# Patient Record
Sex: FEMALE | Race: WHITE | NOT HISPANIC OR LATINO | URBAN - METROPOLITAN AREA
[De-identification: names, ages, dates, MRNs, and addresses within clinical notes are randomized per-mention and may not be internally consistent; named-entity substitution may affect disease eponyms.]

---

## 2017-01-12 ENCOUNTER — OUTPATIENT (OUTPATIENT)
Dept: OUTPATIENT SERVICES | Facility: HOSPITAL | Age: 77
LOS: 1 days | End: 2017-01-12
Payer: MEDICARE

## 2017-01-12 ENCOUNTER — APPOINTMENT (OUTPATIENT)
Dept: CARDIOLOGY | Facility: CLINIC | Age: 77
End: 2017-01-12

## 2017-01-12 VITALS
SYSTOLIC BLOOD PRESSURE: 159 MMHG | BODY MASS INDEX: 27.59 KG/M2 | OXYGEN SATURATION: 96 % | TEMPERATURE: 98.4 F | DIASTOLIC BLOOD PRESSURE: 90 MMHG | WEIGHT: 146 LBS | RESPIRATION RATE: 18 BRPM | HEART RATE: 77 BPM

## 2017-01-12 DIAGNOSIS — Z98.89 OTHER SPECIFIED POSTPROCEDURAL STATES: Chronic | ICD-10-CM

## 2017-01-12 DIAGNOSIS — E07.89 OTHER SPECIFIED DISORDERS OF THYROID: Chronic | ICD-10-CM

## 2017-01-12 DIAGNOSIS — Z00.8 ENCOUNTER FOR OTHER GENERAL EXAMINATION: ICD-10-CM

## 2017-01-12 DIAGNOSIS — N28.89 OTHER SPECIFIED DISORDERS OF KIDNEY AND URETER: Chronic | ICD-10-CM

## 2017-01-12 DIAGNOSIS — Z86.39 PERSONAL HISTORY OF OTHER ENDOCRINE, NUTRITIONAL AND METABOLIC DISEASE: ICD-10-CM

## 2017-01-12 DIAGNOSIS — N83.20 UNSPECIFIED OVARIAN CYSTS: Chronic | ICD-10-CM

## 2017-01-12 PROCEDURE — 93010 ELECTROCARDIOGRAM REPORT: CPT

## 2017-01-12 PROCEDURE — 93005 ELECTROCARDIOGRAM TRACING: CPT

## 2017-01-12 PROCEDURE — G0463: CPT | Mod: 25

## 2017-02-06 ENCOUNTER — RX RENEWAL (OUTPATIENT)
Age: 77
End: 2017-02-06

## 2017-06-06 ENCOUNTER — APPOINTMENT (OUTPATIENT)
Dept: NEUROLOGY | Facility: CLINIC | Age: 77
End: 2017-06-06

## 2017-06-13 ENCOUNTER — APPOINTMENT (OUTPATIENT)
Dept: NEUROLOGY | Facility: CLINIC | Age: 77
End: 2017-06-13

## 2017-06-13 VITALS
DIASTOLIC BLOOD PRESSURE: 87 MMHG | HEIGHT: 61 IN | HEART RATE: 79 BPM | BODY MASS INDEX: 27 KG/M2 | SYSTOLIC BLOOD PRESSURE: 154 MMHG | WEIGHT: 143 LBS

## 2017-06-13 DIAGNOSIS — D32.9 BENIGN NEOPLASM OF MENINGES, UNSPECIFIED: ICD-10-CM

## 2017-06-26 ENCOUNTER — MEDICATION RENEWAL (OUTPATIENT)
Age: 77
End: 2017-06-26

## 2017-06-26 ENCOUNTER — RX RENEWAL (OUTPATIENT)
Age: 77
End: 2017-06-26

## 2017-08-10 ENCOUNTER — APPOINTMENT (OUTPATIENT)
Dept: CARDIOLOGY | Facility: CLINIC | Age: 77
End: 2017-08-10

## 2017-09-09 ENCOUNTER — APPOINTMENT (OUTPATIENT)
Dept: CARDIOLOGY | Facility: CLINIC | Age: 77
End: 2017-09-09

## 2017-10-15 ENCOUNTER — FORM ENCOUNTER (OUTPATIENT)
Age: 77
End: 2017-10-15

## 2017-10-16 ENCOUNTER — APPOINTMENT (OUTPATIENT)
Dept: MRI IMAGING | Facility: IMAGING CENTER | Age: 77
End: 2017-10-16
Payer: MEDICARE

## 2017-10-16 ENCOUNTER — OUTPATIENT (OUTPATIENT)
Dept: OUTPATIENT SERVICES | Facility: HOSPITAL | Age: 77
LOS: 1 days | End: 2017-10-16
Payer: MEDICARE

## 2017-10-16 DIAGNOSIS — N83.20 UNSPECIFIED OVARIAN CYSTS: Chronic | ICD-10-CM

## 2017-10-16 DIAGNOSIS — N28.89 OTHER SPECIFIED DISORDERS OF KIDNEY AND URETER: Chronic | ICD-10-CM

## 2017-10-16 DIAGNOSIS — E07.89 OTHER SPECIFIED DISORDERS OF THYROID: Chronic | ICD-10-CM

## 2017-10-16 DIAGNOSIS — Z98.89 OTHER SPECIFIED POSTPROCEDURAL STATES: Chronic | ICD-10-CM

## 2017-10-16 DIAGNOSIS — Z00.8 ENCOUNTER FOR OTHER GENERAL EXAMINATION: ICD-10-CM

## 2017-10-16 PROCEDURE — 70553 MRI BRAIN STEM W/O & W/DYE: CPT | Mod: 26

## 2017-10-16 PROCEDURE — 70553 MRI BRAIN STEM W/O & W/DYE: CPT

## 2017-10-16 PROCEDURE — 82565 ASSAY OF CREATININE: CPT

## 2017-10-17 ENCOUNTER — OTHER (OUTPATIENT)
Age: 77
End: 2017-10-17

## 2017-10-19 ENCOUNTER — FORM ENCOUNTER (OUTPATIENT)
Age: 77
End: 2017-10-19

## 2017-10-20 ENCOUNTER — APPOINTMENT (OUTPATIENT)
Dept: NEUROLOGY | Facility: CLINIC | Age: 77
End: 2017-10-20
Payer: MEDICARE

## 2017-10-20 ENCOUNTER — APPOINTMENT (OUTPATIENT)
Dept: CT IMAGING | Facility: CLINIC | Age: 77
End: 2017-10-20
Payer: MEDICARE

## 2017-10-20 ENCOUNTER — OUTPATIENT (OUTPATIENT)
Dept: OUTPATIENT SERVICES | Facility: HOSPITAL | Age: 77
LOS: 1 days | End: 2017-10-20
Payer: MEDICARE

## 2017-10-20 VITALS
BODY MASS INDEX: 27 KG/M2 | DIASTOLIC BLOOD PRESSURE: 89 MMHG | WEIGHT: 143 LBS | HEIGHT: 61 IN | SYSTOLIC BLOOD PRESSURE: 165 MMHG | HEART RATE: 82 BPM

## 2017-10-20 DIAGNOSIS — E07.89 OTHER SPECIFIED DISORDERS OF THYROID: Chronic | ICD-10-CM

## 2017-10-20 DIAGNOSIS — Z98.89 OTHER SPECIFIED POSTPROCEDURAL STATES: Chronic | ICD-10-CM

## 2017-10-20 DIAGNOSIS — Z00.8 ENCOUNTER FOR OTHER GENERAL EXAMINATION: ICD-10-CM

## 2017-10-20 DIAGNOSIS — N83.20 UNSPECIFIED OVARIAN CYSTS: Chronic | ICD-10-CM

## 2017-10-20 DIAGNOSIS — N28.89 OTHER SPECIFIED DISORDERS OF KIDNEY AND URETER: Chronic | ICD-10-CM

## 2017-10-20 PROCEDURE — 71260 CT THORAX DX C+: CPT | Mod: 26

## 2017-10-20 PROCEDURE — 82565 ASSAY OF CREATININE: CPT

## 2017-10-20 PROCEDURE — 74177 CT ABD & PELVIS W/CONTRAST: CPT

## 2017-10-20 PROCEDURE — 71260 CT THORAX DX C+: CPT

## 2017-10-20 PROCEDURE — 99215 OFFICE O/P EST HI 40 MIN: CPT

## 2017-10-20 PROCEDURE — 74177 CT ABD & PELVIS W/CONTRAST: CPT | Mod: 26

## 2017-11-02 ENCOUNTER — APPOINTMENT (OUTPATIENT)
Dept: NEUROLOGY | Facility: CLINIC | Age: 77
End: 2017-11-02
Payer: MEDICARE

## 2017-11-02 VITALS
HEIGHT: 63 IN | OXYGEN SATURATION: 97 % | HEART RATE: 86 BPM | WEIGHT: 144 LBS | DIASTOLIC BLOOD PRESSURE: 80 MMHG | BODY MASS INDEX: 25.52 KG/M2 | SYSTOLIC BLOOD PRESSURE: 140 MMHG

## 2017-11-02 DIAGNOSIS — R90.89 OTHER ABNORMAL FINDINGS ON DIAGNOSTIC IMAGING OF CENTRAL NERVOUS SYSTEM: ICD-10-CM

## 2017-11-02 PROCEDURE — 99214 OFFICE O/P EST MOD 30 MIN: CPT

## 2017-11-13 ENCOUNTER — APPOINTMENT (OUTPATIENT)
Dept: MRI IMAGING | Facility: CLINIC | Age: 77
End: 2017-11-13
Payer: MEDICARE

## 2017-11-13 ENCOUNTER — OUTPATIENT (OUTPATIENT)
Dept: OUTPATIENT SERVICES | Facility: HOSPITAL | Age: 77
LOS: 1 days | End: 2017-11-13
Payer: MEDICARE

## 2017-11-13 DIAGNOSIS — Z98.89 OTHER SPECIFIED POSTPROCEDURAL STATES: Chronic | ICD-10-CM

## 2017-11-13 DIAGNOSIS — Z00.8 ENCOUNTER FOR OTHER GENERAL EXAMINATION: ICD-10-CM

## 2017-11-13 DIAGNOSIS — N28.89 OTHER SPECIFIED DISORDERS OF KIDNEY AND URETER: Chronic | ICD-10-CM

## 2017-11-13 DIAGNOSIS — E07.89 OTHER SPECIFIED DISORDERS OF THYROID: Chronic | ICD-10-CM

## 2017-11-13 DIAGNOSIS — N83.20 UNSPECIFIED OVARIAN CYSTS: Chronic | ICD-10-CM

## 2017-11-13 PROCEDURE — 82565 ASSAY OF CREATININE: CPT

## 2017-11-13 PROCEDURE — 70553 MRI BRAIN STEM W/O & W/DYE: CPT | Mod: 26

## 2017-11-13 PROCEDURE — A9585: CPT

## 2017-11-13 PROCEDURE — 70553 MRI BRAIN STEM W/O & W/DYE: CPT

## 2017-11-14 ENCOUNTER — APPOINTMENT (OUTPATIENT)
Dept: NEUROLOGY | Facility: CLINIC | Age: 77
End: 2017-11-14
Payer: MEDICARE

## 2017-11-14 VITALS
OXYGEN SATURATION: 96 % | HEIGHT: 63 IN | WEIGHT: 145 LBS | DIASTOLIC BLOOD PRESSURE: 70 MMHG | TEMPERATURE: 98.1 F | SYSTOLIC BLOOD PRESSURE: 130 MMHG | HEART RATE: 72 BPM | BODY MASS INDEX: 25.69 KG/M2

## 2017-11-14 PROCEDURE — 99213 OFFICE O/P EST LOW 20 MIN: CPT

## 2017-11-16 ENCOUNTER — APPOINTMENT (OUTPATIENT)
Dept: UROLOGY | Facility: CLINIC | Age: 77
End: 2017-11-16

## 2017-12-04 ENCOUNTER — APPOINTMENT (OUTPATIENT)
Dept: NEUROLOGY | Facility: CLINIC | Age: 77
End: 2017-12-04
Payer: MEDICARE

## 2017-12-04 VITALS
SYSTOLIC BLOOD PRESSURE: 165 MMHG | BODY MASS INDEX: 25.69 KG/M2 | DIASTOLIC BLOOD PRESSURE: 86 MMHG | HEIGHT: 63 IN | HEART RATE: 76 BPM | WEIGHT: 145 LBS

## 2017-12-04 PROCEDURE — 99215 OFFICE O/P EST HI 40 MIN: CPT

## 2017-12-07 ENCOUNTER — MEDICATION RENEWAL (OUTPATIENT)
Age: 77
End: 2017-12-07

## 2017-12-07 ENCOUNTER — TRANSCRIPTION ENCOUNTER (OUTPATIENT)
Age: 77
End: 2017-12-07

## 2017-12-07 ENCOUNTER — RX RENEWAL (OUTPATIENT)
Age: 77
End: 2017-12-07

## 2017-12-11 ENCOUNTER — TRANSCRIPTION ENCOUNTER (OUTPATIENT)
Age: 77
End: 2017-12-11

## 2017-12-11 ENCOUNTER — RX RENEWAL (OUTPATIENT)
Age: 77
End: 2017-12-11

## 2017-12-13 RX ORDER — ATORVASTATIN CALCIUM 10 MG/1
10 TABLET, FILM COATED ORAL
Qty: 90 | Refills: 0 | Status: ACTIVE | COMMUNITY
Start: 2017-12-11 | End: 1900-01-01

## 2017-12-26 ENCOUNTER — RX RENEWAL (OUTPATIENT)
Age: 77
End: 2017-12-26

## 2018-01-24 ENCOUNTER — APPOINTMENT (OUTPATIENT)
Dept: UROLOGY | Facility: CLINIC | Age: 78
End: 2018-01-24
Payer: MEDICARE

## 2018-01-24 DIAGNOSIS — Z00.00 ENCOUNTER FOR GENERAL ADULT MEDICAL EXAMINATION W/OUT ABNORMAL FINDINGS: ICD-10-CM

## 2018-01-24 DIAGNOSIS — R31.29 OTHER MICROSCOPIC HEMATURIA: ICD-10-CM

## 2018-01-24 PROCEDURE — 99214 OFFICE O/P EST MOD 30 MIN: CPT

## 2018-01-25 LAB
APPEARANCE: CLEAR
BACTERIA: NEGATIVE
BILIRUBIN URINE: NEGATIVE
BLOOD URINE: NEGATIVE
COLOR: YELLOW
GLUCOSE QUALITATIVE U: NEGATIVE MG/DL
HYALINE CASTS: 0 /LPF
KETONES URINE: NEGATIVE
LEUKOCYTE ESTERASE URINE: ABNORMAL
MICROSCOPIC-UA: NORMAL
NITRITE URINE: NEGATIVE
PH URINE: 7
PROTEIN URINE: NEGATIVE MG/DL
RED BLOOD CELLS URINE: 1 /HPF
SPECIFIC GRAVITY URINE: 1.01
SQUAMOUS EPITHELIAL CELLS: 2 /HPF
UROBILINOGEN URINE: NEGATIVE MG/DL
WHITE BLOOD CELLS URINE: 4 /HPF

## 2018-01-26 LAB — CORE LAB FLUID CYTOLOGY: NORMAL

## 2018-02-15 ENCOUNTER — OUTPATIENT (OUTPATIENT)
Dept: OUTPATIENT SERVICES | Facility: HOSPITAL | Age: 78
LOS: 1 days | End: 2018-02-15
Payer: MEDICARE

## 2018-02-15 ENCOUNTER — APPOINTMENT (OUTPATIENT)
Dept: MRI IMAGING | Facility: IMAGING CENTER | Age: 78
End: 2018-02-15
Payer: MEDICARE

## 2018-02-15 ENCOUNTER — APPOINTMENT (OUTPATIENT)
Dept: NEUROLOGY | Facility: CLINIC | Age: 78
End: 2018-02-15
Payer: MEDICARE

## 2018-02-15 DIAGNOSIS — E07.89 OTHER SPECIFIED DISORDERS OF THYROID: Chronic | ICD-10-CM

## 2018-02-15 DIAGNOSIS — Z98.89 OTHER SPECIFIED POSTPROCEDURAL STATES: Chronic | ICD-10-CM

## 2018-02-15 DIAGNOSIS — N28.89 OTHER SPECIFIED DISORDERS OF KIDNEY AND URETER: Chronic | ICD-10-CM

## 2018-02-15 DIAGNOSIS — N83.20 UNSPECIFIED OVARIAN CYSTS: Chronic | ICD-10-CM

## 2018-02-15 DIAGNOSIS — R90.89 OTHER ABNORMAL FINDINGS ON DIAGNOSTIC IMAGING OF CENTRAL NERVOUS SYSTEM: ICD-10-CM

## 2018-02-15 PROCEDURE — A9585: CPT

## 2018-02-15 PROCEDURE — 99214 OFFICE O/P EST MOD 30 MIN: CPT

## 2018-02-15 PROCEDURE — 70553 MRI BRAIN STEM W/O & W/DYE: CPT

## 2018-02-15 PROCEDURE — 82565 ASSAY OF CREATININE: CPT

## 2018-02-15 PROCEDURE — 70553 MRI BRAIN STEM W/O & W/DYE: CPT | Mod: 26

## 2018-02-28 ENCOUNTER — APPOINTMENT (OUTPATIENT)
Dept: CARDIOLOGY | Facility: CLINIC | Age: 78
End: 2018-02-28

## 2018-06-01 ENCOUNTER — APPOINTMENT (OUTPATIENT)
Dept: NEUROLOGY | Facility: CLINIC | Age: 78
End: 2018-06-01
Payer: MEDICARE

## 2018-06-01 VITALS
BODY MASS INDEX: 25.34 KG/M2 | SYSTOLIC BLOOD PRESSURE: 149 MMHG | WEIGHT: 143 LBS | HEART RATE: 83 BPM | HEIGHT: 63 IN | DIASTOLIC BLOOD PRESSURE: 83 MMHG

## 2018-06-01 PROCEDURE — 99215 OFFICE O/P EST HI 40 MIN: CPT

## 2019-01-25 ENCOUNTER — APPOINTMENT (OUTPATIENT)
Dept: NEUROLOGY | Facility: CLINIC | Age: 79
End: 2019-01-25
Payer: MEDICARE

## 2019-01-25 VITALS
DIASTOLIC BLOOD PRESSURE: 81 MMHG | BODY MASS INDEX: 26.05 KG/M2 | HEIGHT: 63 IN | WEIGHT: 147 LBS | SYSTOLIC BLOOD PRESSURE: 154 MMHG | HEART RATE: 77 BPM

## 2019-01-25 PROCEDURE — 99215 OFFICE O/P EST HI 40 MIN: CPT

## 2019-01-25 NOTE — DISCUSSION/SUMMARY
[FreeTextEntry1] : 77 yo female with focal seizures presenting as difficulty speaking with R sided numbness 2/2 meningioma resected in Aug 2014 well controlled on keppra 750mg bid and vimpat 100mg bid; no further seizures since Sept 2014.\par \par New left temporal enhancing lesion on MRI.\par \par 1) Focal seizures\par C/w keppra 750mg bid, vimpat 100mg bid\par Does not want to change medication at this time\par Will rediscuss at next visit.\par \par 2) Meningioma\par F/U MRI in Spring 2019\par \par Side effects of medication discussed in detail.\par \par Patient seen for 40 min face to face with >50% in counseling and discussion.

## 2019-01-25 NOTE — HISTORY OF PRESENT ILLNESS
[FreeTextEntry1] : The patient is a 78 year-old right handed woman whose neurologic history began on July 19th, 2014, when she had an episode of being unable to speak. She was complaining of numbness and tingling on right face and right fingers just before the episode. The episode lasted about one hour, and the patient states she remembers the entire spells. Daughter called EMS, and she was taken to [facility] in [place] and stroke and TIA ruled out, and MRA normal. She was confused in the hospital but states she remembers the hospital course and was hospitalized for two days. However a 2 cm meningioma was found. She returned to [facility] and was seen by Dr. Gardner on June 23rd and she started on Keppra 500 mg BID.\par \par She was increased on her Keppra to 750mg bid and Vimpat 100mg bid and had meningioma resection in August 2014.\par She had slight upper extremity right sided weakness and fine motor deficits in the right hand upon discharge. On August 22nd 2014, she had another discrete event of word finding difficulties and numbness in the right hand.\par \par She has been doing very well on Keppra and Vimpat with no complaints on the medication. She has had no further events and had repeat MRI in February 2016 that was read with no changes.  However, MRI in October 2017 shows subcentimeter enhancing lesion in the left temporal lobe.\par \par PMHx, FHx, SocHx unchanged\par \par Interval History\par Patient reports no further seizures since Sept 2014.  She has denies numbness, tingling, or changes in speech since that time. She is tolerating Keppra 750mg bid and vimpat 100mg bid well.   Last year found to have possible new lesion on brain, followed by Dr. Finch and no longer there (MRI Feb 2018).

## 2019-01-25 NOTE — PHYSICAL EXAM
[FreeTextEntry1] : MS: AAO x 3, no dysarthria, no aphasia\par CN: PERRL, EOMI, VFF, V1-V3 sensation intact, no facial asymmetry\par Motor: 5/5 x 4 extremities\par Sensory: intact to LT throughout\par Coordination: no dysmetria on FTN\par Gait: steady [General Appearance - Alert] : alert [General Appearance - In No Acute Distress] : in no acute distress [Sclera] : the sclera and conjunctiva were normal [PERRL With Normal Accommodation] : pupils were equal in size, round, reactive to light, with normal accommodation [Outer Ear] : the ears and nose were normal in appearance [Neck Appearance] : the appearance of the neck was normal [] : no respiratory distress [Abnormal Walk] : normal gait

## 2019-01-30 ENCOUNTER — APPOINTMENT (OUTPATIENT)
Dept: UROLOGY | Facility: CLINIC | Age: 79
End: 2019-01-30
Payer: MEDICARE

## 2019-01-30 PROCEDURE — 99213 OFFICE O/P EST LOW 20 MIN: CPT

## 2019-01-31 LAB — BACTERIA UR CULT: NORMAL

## 2019-02-28 ENCOUNTER — TRANSCRIPTION ENCOUNTER (OUTPATIENT)
Age: 79
End: 2019-02-28

## 2019-04-23 ENCOUNTER — RX RENEWAL (OUTPATIENT)
Age: 79
End: 2019-04-23

## 2019-06-04 ENCOUNTER — APPOINTMENT (OUTPATIENT)
Dept: NEUROLOGY | Facility: CLINIC | Age: 79
End: 2019-06-04
Payer: MEDICARE

## 2019-06-04 VITALS
WEIGHT: 146 LBS | BODY MASS INDEX: 25.87 KG/M2 | SYSTOLIC BLOOD PRESSURE: 171 MMHG | HEART RATE: 87 BPM | HEIGHT: 63 IN | DIASTOLIC BLOOD PRESSURE: 82 MMHG

## 2019-06-04 PROCEDURE — 99214 OFFICE O/P EST MOD 30 MIN: CPT

## 2019-06-04 NOTE — DISCUSSION/SUMMARY
[FreeTextEntry1] : 78 yo female with focal seizures presenting as difficulty speaking with R sided numbness 2/2 meningioma resected in Aug 2014 well controlled on keppra 750mg bid and vimpat 100mg bid; no further seizures since Sept 2014.\par \par New left temporal enhancing lesion on MRI in 2017, found to not be present in further imaging.\par \par 1) Focal seizures\par C/w keppra 750mg bid, vimpat 100mg bid\par Does not want to change medication at this time\par Will rediscuss at next visit.\par \par 2) Meningioma\par F/U MRI ordered today.\par \par Side effects of medication discussed in detail.\par \par Patient seen for 30 min face to face with >50% in counseling and discussion.

## 2019-06-04 NOTE — HISTORY OF PRESENT ILLNESS
[FreeTextEntry1] : The patient is a 79 year-old right handed woman whose neurologic history began on July 19th, 2014, when she had an episode of being unable to speak. She was complaining of numbness and tingling on right face and right fingers just before the episode. The episode lasted about one hour, and the patient states she remembers the entire spells. Daughter called EMS, and [facility] [place] and stroke and TIA ruled out, and MRA normal. She was confused in the hospital but states she remembers the hospital course and was hospitalized for two days. However a 2 cm meningioma was found. She returned to [facility] and was seen by Dr. Gardner on June 23rd and she started on Keppra 500 mg BID.\par \par She was increased on her Keppra to 750mg bid and Vimpat 100mg bid and had meningioma resection in August 2014.\par She had slight upper extremity right sided weakness and fine motor deficits in the right hand upon discharge. On August 22nd 2014, she had another discrete event of word finding difficulties and numbness in the right hand.\par \par She has been doing very well on Keppra and Vimpat with no complaints on the medication. She has had no further events and had repeat MRI in February 2016 that was read with no changes.  However, MRI in October 2017 shows subcentimeter enhancing lesion in the left temporal lobe.\par \par PMHx, FHx, SocHx unchanged\par \par Interval History\par Patient reports no further seizures since Sept 2014.  She has denies numbness, tingling, or changes in speech since that time. She is tolerating Keppra 750mg bid and vimpat 100mg bid well.   Last year found to have possible new lesion on brain, followed by Dr. Finch and no longer there (MRI Feb 2018).

## 2019-06-04 NOTE — PHYSICAL EXAM
[FreeTextEntry1] : MS: AAO x 3, no dysarthria, no aphasia\par CN: PERRL, EOMI, VFF, V1-V3 sensation intact, no facial asymmetry\par Motor: 5/5 x 4 extremities\par Sensory: intact to LT throughout\par Coordination: no dysmetria on FTN\par Gait: steady [General Appearance - Alert] : alert [Sclera] : the sclera and conjunctiva were normal [PERRL With Normal Accommodation] : pupils were equal in size, round, reactive to light, with normal accommodation [General Appearance - In No Acute Distress] : in no acute distress [Outer Ear] : the ears and nose were normal in appearance [Neck Appearance] : the appearance of the neck was normal [] : no respiratory distress [Abnormal Walk] : normal gait

## 2019-06-17 ENCOUNTER — OUTPATIENT (OUTPATIENT)
Dept: OUTPATIENT SERVICES | Facility: HOSPITAL | Age: 79
LOS: 1 days | End: 2019-06-17

## 2019-06-17 ENCOUNTER — APPOINTMENT (OUTPATIENT)
Dept: MRI IMAGING | Facility: CLINIC | Age: 79
End: 2019-06-17

## 2019-06-17 DIAGNOSIS — N83.20 UNSPECIFIED OVARIAN CYSTS: Chronic | ICD-10-CM

## 2019-06-17 DIAGNOSIS — N28.89 OTHER SPECIFIED DISORDERS OF KIDNEY AND URETER: Chronic | ICD-10-CM

## 2019-06-17 DIAGNOSIS — E07.89 OTHER SPECIFIED DISORDERS OF THYROID: Chronic | ICD-10-CM

## 2019-06-17 DIAGNOSIS — Z98.89 OTHER SPECIFIED POSTPROCEDURAL STATES: Chronic | ICD-10-CM

## 2019-06-17 DIAGNOSIS — D32.9 BENIGN NEOPLASM OF MENINGES, UNSPECIFIED: ICD-10-CM

## 2019-08-29 ENCOUNTER — FORM ENCOUNTER (OUTPATIENT)
Age: 79
End: 2019-08-29

## 2019-08-30 ENCOUNTER — APPOINTMENT (OUTPATIENT)
Dept: MRI IMAGING | Facility: IMAGING CENTER | Age: 79
End: 2019-08-30
Payer: MEDICARE

## 2019-08-30 ENCOUNTER — OUTPATIENT (OUTPATIENT)
Dept: OUTPATIENT SERVICES | Facility: HOSPITAL | Age: 79
LOS: 1 days | End: 2019-08-30
Payer: MEDICARE

## 2019-08-30 DIAGNOSIS — Z98.89 OTHER SPECIFIED POSTPROCEDURAL STATES: Chronic | ICD-10-CM

## 2019-08-30 DIAGNOSIS — N83.20 UNSPECIFIED OVARIAN CYSTS: Chronic | ICD-10-CM

## 2019-08-30 DIAGNOSIS — E07.89 OTHER SPECIFIED DISORDERS OF THYROID: Chronic | ICD-10-CM

## 2019-08-30 DIAGNOSIS — D32.9 BENIGN NEOPLASM OF MENINGES, UNSPECIFIED: ICD-10-CM

## 2019-08-30 DIAGNOSIS — N28.89 OTHER SPECIFIED DISORDERS OF KIDNEY AND URETER: Chronic | ICD-10-CM

## 2019-08-30 PROCEDURE — 70553 MRI BRAIN STEM W/O & W/DYE: CPT | Mod: 26

## 2019-08-30 PROCEDURE — 70553 MRI BRAIN STEM W/O & W/DYE: CPT

## 2019-08-30 PROCEDURE — A9585: CPT

## 2019-10-21 ENCOUNTER — RX RENEWAL (OUTPATIENT)
Age: 79
End: 2019-10-21

## 2019-10-22 ENCOUNTER — MEDICATION RENEWAL (OUTPATIENT)
Age: 79
End: 2019-10-22

## 2019-12-02 ENCOUNTER — APPOINTMENT (OUTPATIENT)
Dept: NEUROLOGY | Facility: CLINIC | Age: 79
End: 2019-12-02
Payer: MEDICARE

## 2019-12-02 VITALS
SYSTOLIC BLOOD PRESSURE: 161 MMHG | DIASTOLIC BLOOD PRESSURE: 88 MMHG | HEIGHT: 63 IN | WEIGHT: 146 LBS | BODY MASS INDEX: 25.87 KG/M2 | HEART RATE: 91 BPM

## 2019-12-02 PROCEDURE — 99214 OFFICE O/P EST MOD 30 MIN: CPT

## 2019-12-02 NOTE — DISCUSSION/SUMMARY
[FreeTextEntry1] : 78 yo female with focal seizures presenting as difficulty speaking with R sided numbness 2/2 meningioma resected in Aug 2014 well controlled on keppra 750mg bid and vimpat 100mg bid; no further seizures since Sept 2014.\par \par New left temporal enhancing lesion on MRI in 2017, found to not be present in further imaging.\par \par 1) Focal seizures\par C/w keppra 750mg bid, vimpat 100mg bid\par Does not want to change medication at this time\par Will rediscuss at next visit.\par \par 2) Meningioma\par F/U MRI end 2020 - 2019 is stable exam.\par \par Side effects of medication discussed in detail.\par \par Patient seen for 30 min face to face with >50% in counseling and discussion.

## 2019-12-02 NOTE — PHYSICAL EXAM
[FreeTextEntry1] : MS: AAO x 3, no dysarthria, no aphasia\par CN: PERRL, EOMI, VFF, V1-V3 sensation intact, no facial asymmetry\par Motor: 5/5 x 4 extremities\par Sensory: intact to LT throughout\par Coordination: no dysmetria on FTN\par Gait: steady [General Appearance - In No Acute Distress] : in no acute distress [Sclera] : the sclera and conjunctiva were normal [General Appearance - Alert] : alert [Outer Ear] : the ears and nose were normal in appearance [PERRL With Normal Accommodation] : pupils were equal in size, round, reactive to light, with normal accommodation [Neck Appearance] : the appearance of the neck was normal [] : no respiratory distress [Abnormal Walk] : normal gait

## 2019-12-02 NOTE — HISTORY OF PRESENT ILLNESS
[FreeTextEntry1] : The patient is a 79 year-old right handed woman whose neurologic history began on July 19th, 2014, when she had an episode of being unable to speak. She was complaining of numbness and tingling on right face and right fingers just before the episode. The episode lasted about one hour, and the patient states she remembers the entire spells. Daughter called EMS, and she was taken to [facility] in [place] and stroke and TIA ruled out, and MRA normal. She was confused in the hospital but states she remembers the hospital course and was hospitalized for two days. However a 2 cm meningioma was found. She returned to [facility] and was seen by Dr. Gardner on June 23rd and she started on Keppra 500 mg BID.\par \par She was increased on her Keppra to 750mg bid and Vimpat 100mg bid and had meningioma resection in August 2014.\par She had slight upper extremity right sided weakness and fine motor deficits in the right hand upon discharge. On August 22nd 2014, she had another discrete event of word finding difficulties and numbness in the right hand.\par \par She has been doing very well on Keppra and Vimpat with no complaints on the medication. She has had no further events and had repeat MRI in February 2016 that was read with no changes.  However, MRI in October 2017 shows subcentimeter enhancing lesion in the left temporal lobe.\par \par PMHx, FHx, SocHx unchanged\par \par Interval History\par Patient reports no further seizures since Sept 2014.  She has denies numbness, tingling, or changes in speech since that time. She is tolerating Keppra 750mg bid and vimpat 100mg bid well.   2017 found to have possible new lesion on brain, followed by Dr. Finch and no longer there (MRI Feb 2018 and Aug 2019).

## 2020-03-05 ENCOUNTER — APPOINTMENT (OUTPATIENT)
Dept: UROLOGY | Facility: CLINIC | Age: 80
End: 2020-03-05
Payer: MEDICARE

## 2020-03-05 PROCEDURE — 99213 OFFICE O/P EST LOW 20 MIN: CPT

## 2020-03-06 LAB
APPEARANCE: CLEAR
BACTERIA: NEGATIVE
BILIRUBIN URINE: NEGATIVE
BLOOD URINE: NEGATIVE
COLOR: NORMAL
GLUCOSE QUALITATIVE U: NEGATIVE
HYALINE CASTS: 1 /LPF
KETONES URINE: NEGATIVE
LEUKOCYTE ESTERASE URINE: ABNORMAL
MICROSCOPIC-UA: NORMAL
NITRITE URINE: NEGATIVE
PH URINE: 7.5
PROTEIN URINE: NEGATIVE
RED BLOOD CELLS URINE: 2 /HPF
SPECIFIC GRAVITY URINE: 1.01
SQUAMOUS EPITHELIAL CELLS: 4 /HPF
UROBILINOGEN URINE: NORMAL
WHITE BLOOD CELLS URINE: 6 /HPF

## 2020-03-07 LAB
BACTERIA UR CULT: NORMAL
URINE CYTOLOGY: NORMAL

## 2020-06-04 ENCOUNTER — RX RENEWAL (OUTPATIENT)
Age: 80
End: 2020-06-04

## 2020-06-26 ENCOUNTER — APPOINTMENT (OUTPATIENT)
Dept: NEUROLOGY | Facility: CLINIC | Age: 80
End: 2020-06-26
Payer: MEDICARE

## 2020-06-26 PROCEDURE — 99214 OFFICE O/P EST MOD 30 MIN: CPT | Mod: 95

## 2020-06-26 NOTE — DISCUSSION/SUMMARY
[FreeTextEntry1] : 81 yo female with focal seizures presenting as difficulty speaking with R sided numbness 2/2 meningioma resected in Aug 2014 well controlled on keppra 750mg bid and vimpat 100mg bid; no further seizures since Sept 2014.\par \par New left temporal enhancing lesion on MRI in 2017, found to not be present in further imaging.\par \par 1) Focal seizures\par C/w keppra 750mg bid, vimpat 100mg bid\par Does not want to change medication at this time\par Will rediscuss at next visit.\par \par 2) Meningioma\par F/U MRI end 2020 - 2019 is stable exam.\par \par Side effects of medication discussed in detail.\par \par Patient seen for 30 min face to face with >50% in counseling and discussion.

## 2020-06-26 NOTE — HISTORY OF PRESENT ILLNESS
[Home] : at home, [unfilled] , at the time of the visit. [Other Location: e.g. Home (Enter Location, City,State)___] : at [unfilled] [Spouse] : spouse [Verbal consent obtained from patient] : the patient, [unfilled] [FreeTextEntry1] : The patient is a 80 year-old right handed woman whose neurologic history began on July 19th, 2014, when she had an episode of being unable to speak. She was complaining of numbness and tingling on right face and right fingers just before the episode. The episode lasted about one hour, and the patient states she remembers the entire spells. Daughter called EMS, and she was taken to [facility] in [place] and stroke and TIA ruled out, and MRA normal. She was confused in the hospital but states she remembers the hospital course and was hospitalized for two days. However a 2 cm meningioma was found. She returned to [facility] and was seen by Dr. Gardner on June 23rd and she started on Keppra 500 mg BID.\par \par She was increased on her Keppra to 750mg bid and Vimpat 100mg bid and had meningioma resection in August 2014.\par She had slight upper extremity right sided weakness and fine motor deficits in the right hand upon discharge. On August 22nd 2014, she had another discrete event of word finding difficulties and numbness in the right hand.\par \par She has been doing very well on Keppra and Vimpat with no complaints on the medication. She has had no further events and had repeat MRI in February 2016 that was read with no changes.  However, MRI in October 2017 shows subcentimeter enhancing lesion in the left temporal lobe.\par \par PMHx, FHx, SocHx unchanged\par \par Interval History\par Patient reports no further seizures since Sept 2014.  She has denies numbness, tingling, or changes in speech since that time. She is tolerating Keppra 750mg bid and vimpat 100mg bid well.   2017 found to have possible new lesion on brain, followed by Dr. Finch and no longer there (MRI Feb 2018 and Aug 2019).

## 2020-06-26 NOTE — PHYSICAL EXAM
[General Appearance - In No Acute Distress] : in no acute distress [General Appearance - Alert] : alert [PERRL With Normal Accommodation] : pupils were equal in size, round, reactive to light, with normal accommodation [Sclera] : the sclera and conjunctiva were normal [Neck Appearance] : the appearance of the neck was normal [Abnormal Walk] : normal gait [Outer Ear] : the ears and nose were normal in appearance [FreeTextEntry1] : MS: AAO x 3, no dysarthria, no aphasia\par CN: PERRL, EOMI, VFF, V1-V3 sensation intact, no facial asymmetry\par Motor: 5/5 x 4 extremities\par Sensory: intact to LT throughout\par Coordination: no dysmetria on FTN\par Gait: steady

## 2020-12-03 ENCOUNTER — RX RENEWAL (OUTPATIENT)
Age: 80
End: 2020-12-03

## 2021-01-26 ENCOUNTER — APPOINTMENT (OUTPATIENT)
Dept: NEUROLOGY | Facility: CLINIC | Age: 81
End: 2021-01-26
Payer: MEDICARE

## 2021-01-26 PROCEDURE — 99214 OFFICE O/P EST MOD 30 MIN: CPT | Mod: 95

## 2021-01-26 NOTE — PHYSICAL EXAM
[FreeTextEntry1] : MS: AAO x 3, no dysarthria, no aphasia\par CN: PERRL, EOMI, VFF, V1-V3 sensation intact, no facial asymmetry\par Motor: 5/5 x 4 extremities\par Sensory: intact to LT throughout\par Coordination: no dysmetria on FTN\par Gait: steady [General Appearance - Alert] : alert [General Appearance - In No Acute Distress] : in no acute distress [Sclera] : the sclera and conjunctiva were normal [PERRL With Normal Accommodation] : pupils were equal in size, round, reactive to light, with normal accommodation [Outer Ear] : the ears and nose were normal in appearance [Neck Appearance] : the appearance of the neck was normal [Abnormal Walk] : normal gait

## 2021-01-26 NOTE — HISTORY OF PRESENT ILLNESS
[FreeTextEntry1] : The patient is a 80 year-old right handed woman whose neurologic history began on July 19th, 2014, when she had an episode of being unable to speak. She was complaining of numbness and tingling on right face and right fingers just before the episode. The episode lasted about one hour, and the patient states she remembers the entire spells. Daughter called EMS, and she was taken to [facility] in [place] and stroke and TIA ruled out, and MRA normal. She was confused in the hospital but states she remembers the hospital course and was hospitalized for two days. However a 2 cm meningioma was found. [facility] by Dr. Gardner on June 23rd and she started on Keppra 500 mg BID.\par \par She was increased on her Keppra to 750mg bid and Vimpat 100mg bid and had meningioma resection in August 2014.\par \par She had slight upper extremity right sided weakness and fine motor deficits in the right hand upon discharge. On August 22nd 2014, she had another discrete event of word finding difficulties and numbness in the right hand.\par \par She has been doing very well on Keppra and Vimpat with no complaints on the medication. She has had no further events and had repeat MRI in February 2016 that was read with no changes.  However, MRI in October 2017 shows subcentimeter enhancing lesion in the left temporal lobe.\par \par PMHx, FHx, SocHx unchanged\par \par Interval History\par Patient reports no further seizures since Sept 2014.  She has denies numbness, tingling, or changes in speech since that time. She is tolerating Keppra 750mg bid and vimpat 100mg bid well.   2017 found to have possible new lesion on brain, followed by Dr. Finch and no longer there (MRI Feb 2018 and Aug 2019). [Home] : at home, [unfilled] , at the time of the visit. [Medical Office: ([facility])___] : at the medical office located in  [Spouse] : spouse [Verbal consent obtained from patient] : the patient, [unfilled]

## 2021-01-26 NOTE — DISCUSSION/SUMMARY
[FreeTextEntry1] : 79 yo female with focal seizures presenting as difficulty speaking with R sided numbness 2/2 meningioma resected in Aug 2014 well controlled on keppra 750mg bid and vimpat 100mg bid; no further seizures since Sept 2014.\par \par New left temporal enhancing lesion on MRI in 2017, found to not be present in further imaging.\par \par 1) Focal seizures\par C/w keppra 750mg bid, vimpat 100mg bid\par Does not want to change medication at this time\par Will rediscuss at next visit.\par \par 2) Meningioma\par Will check in summer - will hold off now because of COVID\par \par Side effects of medication discussed in detail.\par \par Patient seen for 30 min face to face with >50% in counseling and discussion.

## 2021-04-14 ENCOUNTER — APPOINTMENT (OUTPATIENT)
Dept: UROLOGY | Facility: CLINIC | Age: 81
End: 2021-04-14
Payer: MEDICARE

## 2021-04-14 PROCEDURE — 99213 OFFICE O/P EST LOW 20 MIN: CPT

## 2021-04-15 LAB
APPEARANCE: ABNORMAL
BACTERIA: ABNORMAL
BILIRUBIN URINE: NEGATIVE
BLOOD URINE: NORMAL
COLOR: NORMAL
GLUCOSE QUALITATIVE U: NEGATIVE
HYALINE CASTS: 0 /LPF
KETONES URINE: NEGATIVE
LEUKOCYTE ESTERASE URINE: ABNORMAL
MICROSCOPIC-UA: NORMAL
NITRITE URINE: NEGATIVE
PH URINE: 6.5
PROTEIN URINE: ABNORMAL
RED BLOOD CELLS URINE: 1 /HPF
SPECIFIC GRAVITY URINE: >=1.03
SQUAMOUS EPITHELIAL CELLS: 2 /HPF
UROBILINOGEN URINE: NORMAL
WHITE BLOOD CELLS URINE: 3 /HPF

## 2021-04-30 ENCOUNTER — RX RENEWAL (OUTPATIENT)
Age: 81
End: 2021-04-30

## 2021-06-09 ENCOUNTER — RX RENEWAL (OUTPATIENT)
Age: 81
End: 2021-06-09

## 2021-08-20 ENCOUNTER — APPOINTMENT (OUTPATIENT)
Dept: NEUROLOGY | Facility: CLINIC | Age: 81
End: 2021-08-20
Payer: MEDICARE

## 2021-08-20 VITALS
BODY MASS INDEX: 25.69 KG/M2 | HEIGHT: 63 IN | HEART RATE: 84 BPM | WEIGHT: 145 LBS | SYSTOLIC BLOOD PRESSURE: 163 MMHG | DIASTOLIC BLOOD PRESSURE: 88 MMHG

## 2021-08-20 PROCEDURE — 99214 OFFICE O/P EST MOD 30 MIN: CPT

## 2021-08-20 NOTE — HISTORY OF PRESENT ILLNESS
[Home] : at home, [unfilled] , at the time of the visit. [Medical Office: ([facility])___] : at the medical office located in  [Spouse] : spouse [Verbal consent obtained from patient] : the patient, [unfilled] [FreeTextEntry1] : ***08/20/2021***\par Patient presents for follow up visit. Patient reports no breakthrough seizure events. Patient reports compliance with regimen of keppra 750 mg BID and vimpat 100 ng BID. Patient reports mild anxiety but it is unclear if it is related to to keppra patient also reports low energy. \par \par \par The patient is a 80 year-old right handed woman whose neurologic history began on July 19th, 2014, when she had an episode of being unable to speak. She was complaining of numbness and tingling on right face and right fingers just before the episode. The episode lasted about one hour, and the patient states she remembers the entire spells. Daughter called EMS, and she was taken to [facility] in [place] and stroke and TIA ruled out, and MRA normal. She was confused in the hospital but states she remembers the hospital course and was hospitalized for two days. However a 2 cm meningioma was found. She returned to [facility] and was seen by Dr. Gardner on June 23rd and she started on Keppra 500 mg BID.\par \par She was increased on her Keppra to 750mg bid and Vimpat 100mg bid and had meningioma resection in August 2014.\par \par She had slight upper extremity right sided weakness and fine motor deficits in the right hand upon discharge. On August 22nd 2014, she had another discrete event of word finding difficulties and numbness in the right hand.\par \par She has been doing very well on Keppra and Vimpat with no complaints on the medication. She has had no further events and had repeat MRI in February 2016 that was read with no changes.  However, MRI in October 2017 shows subcentimeter enhancing lesion in the left temporal lobe.\par \par PMHx, FHx, SocHx unchanged\par \par Interval History\par Patient reports no further seizures since Sept 2014.  She has denies numbness, tingling, or changes in speech since that time. She is tolerating Keppra 750mg bid and vimpat 100mg bid well.   2017 found to have possible new lesion on brain, followed by Dr. Finch and no longer there (MRI Feb 2018 and Aug 2019).

## 2021-08-20 NOTE — DISCUSSION/SUMMARY
[FreeTextEntry1] : 80 yo female with focal seizures presenting as difficulty speaking with R sided numbness 2/2 meningioma resected in Aug 2014 well controlled on keppra 750mg bid and vimpat 100mg bid; no further seizures since Sept 2014.\par \par New left temporal enhancing lesion on MRI in 2017, found to not be present in further imaging.\par \par 1) Focal seizures\par C/w keppra 750mg bid, vimpat 100mg bid\par Does not want to change medication at this time\par Will rediscuss at next visit.\par \par 2) Meningioma\par Will check in summer - will hold off now because of COVID\par Patient knows to contact if any changes.\par Check MRI q 2-3 yrs.\par \par Side effects of medication discussed in detail.\par \par Patient seen for 30 min face to face with >50% in counseling and discussion.

## 2021-08-20 NOTE — PHYSICAL EXAM
[General Appearance - Alert] : alert [Sclera] : the sclera and conjunctiva were normal [PERRL With Normal Accommodation] : pupils were equal in size, round, reactive to light, with normal accommodation [Outer Ear] : the ears and nose were normal in appearance [Neck Appearance] : the appearance of the neck was normal [Abnormal Walk] : normal gait [General Appearance - Well Developed] : well developed [General Appearance - Well Nourished] : well nourished [Normal Appearance] : normal appearance [Well Groomed] : well groomed [General Appearance - In No Acute Distress] : no acute distress [Abdomen Soft] : soft [Abdomen Tenderness] : non-tender [Urinary Bladder Findings] : the bladder was normal on palpation [Costovertebral Angle Tenderness] : no ~M costovertebral angle tenderness [Edema] : no peripheral edema [] : no respiratory distress [Respiration, Rhythm And Depth] : normal respiratory rhythm and effort [Exaggerated Use Of Accessory Muscles For Inspiration] : no accessory muscle use [Oriented To Time, Place, And Person] : oriented to person, place, and time [Affect] : the affect was normal [Mood] : the mood was normal [Not Anxious] : not anxious [Normal Station and Gait] : the gait and station were normal for the patient's age [No Focal Deficits] : no focal deficits [No Palpable Adenopathy] : no palpable adenopathy [FreeTextEntry1] : MS: AAO x 3, no dysarthria, no aphasia\par CN: PERRL, EOMI, VFF, V1-V3 sensation intact, no facial asymmetry\par Motor: 5/5 x 4 extremities\par Sensory: intact to LT throughout\par Coordination: no dysmetria on FTN\par Gait: steady

## 2022-03-07 ENCOUNTER — RX RENEWAL (OUTPATIENT)
Age: 82
End: 2022-03-07

## 2022-04-13 ENCOUNTER — APPOINTMENT (OUTPATIENT)
Dept: UROLOGY | Facility: CLINIC | Age: 82
End: 2022-04-13

## 2022-08-31 ENCOUNTER — APPOINTMENT (OUTPATIENT)
Dept: UROLOGY | Facility: CLINIC | Age: 82
End: 2022-08-31

## 2022-08-31 VITALS — DIASTOLIC BLOOD PRESSURE: 90 MMHG | SYSTOLIC BLOOD PRESSURE: 158 MMHG | HEART RATE: 94 BPM

## 2022-08-31 PROCEDURE — 99213 OFFICE O/P EST LOW 20 MIN: CPT

## 2022-09-01 LAB
APPEARANCE: CLEAR
BACTERIA: NEGATIVE
BILIRUBIN URINE: NEGATIVE
BLOOD URINE: NEGATIVE
COLOR: NORMAL
GLUCOSE QUALITATIVE U: NEGATIVE
HYALINE CASTS: 0 /LPF
KETONES URINE: NEGATIVE
LEUKOCYTE ESTERASE URINE: NEGATIVE
MICROSCOPIC-UA: NORMAL
NITRITE URINE: NEGATIVE
PH URINE: 7
PROTEIN URINE: NEGATIVE
RED BLOOD CELLS URINE: 1 /HPF
SPECIFIC GRAVITY URINE: 1.02
SQUAMOUS EPITHELIAL CELLS: 2 /HPF
UROBILINOGEN URINE: NORMAL
WHITE BLOOD CELLS URINE: 5 /HPF

## 2022-09-03 ENCOUNTER — RX RENEWAL (OUTPATIENT)
Age: 82
End: 2022-09-03

## 2022-09-05 ENCOUNTER — RX RENEWAL (OUTPATIENT)
Age: 82
End: 2022-09-05

## 2022-09-05 LAB — URINE CYTOLOGY: NORMAL

## 2022-09-13 ENCOUNTER — APPOINTMENT (OUTPATIENT)
Dept: NEUROLOGY | Facility: CLINIC | Age: 82
End: 2022-09-13

## 2022-09-13 VITALS
HEART RATE: 90 BPM | HEIGHT: 63 IN | DIASTOLIC BLOOD PRESSURE: 89 MMHG | WEIGHT: 142 LBS | SYSTOLIC BLOOD PRESSURE: 152 MMHG | BODY MASS INDEX: 25.16 KG/M2

## 2022-09-13 PROCEDURE — 99215 OFFICE O/P EST HI 40 MIN: CPT

## 2022-09-13 NOTE — PHYSICAL EXAM
[General Appearance - Alert] : alert [Sclera] : the sclera and conjunctiva were normal [PERRL With Normal Accommodation] : pupils were equal in size, round, reactive to light, with normal accommodation [Outer Ear] : the ears and nose were normal in appearance [Neck Appearance] : the appearance of the neck was normal [Abnormal Walk] : normal gait [General Appearance - Well Developed] : well developed [General Appearance - Well Nourished] : well nourished [Normal Appearance] : normal appearance [Well Groomed] : well groomed [General Appearance - In No Acute Distress] : no acute distress [Abdomen Soft] : soft [Abdomen Tenderness] : non-tender [Costovertebral Angle Tenderness] : no ~M costovertebral angle tenderness [Urinary Bladder Findings] : the bladder was normal on palpation [Edema] : no peripheral edema [] : no respiratory distress [Respiration, Rhythm And Depth] : normal respiratory rhythm and effort [Exaggerated Use Of Accessory Muscles For Inspiration] : no accessory muscle use [Oriented To Time, Place, And Person] : oriented to person, place, and time [Affect] : the affect was normal [Mood] : the mood was normal [Not Anxious] : not anxious [Normal Station and Gait] : the gait and station were normal for the patient's age [No Focal Deficits] : no focal deficits [No Palpable Adenopathy] : no palpable adenopathy [FreeTextEntry1] : MS: AAO x 3, no dysarthria, no aphasia\par CN: PERRL, EOMI, VFF, V1-V3 sensation intact, no facial asymmetry\par Motor: 5/5 x 4 extremities\par Sensory: intact to LT throughout\par Coordination: no dysmetria on FTN\par Gait: steady

## 2022-09-13 NOTE — HISTORY OF PRESENT ILLNESS
[FreeTextEntry1] : ***09/13/2022***\par Patient presents for follow up visit. Patient reports no breakthrough seizure events. Patient reports compliance with regimen of keppra 750 mg BID and vimpat 100 ng BID.\par \par ***08/20/2021***\par Patient presents for follow up visit. Patient reports no breakthrough seizure events. Patient reports compliance with regimen of keppra 750 mg BID and vimpat 100 ng BID. Patient reports mild anxiety but it is unclear if it is related to to keppra patient also reports low energy. \par \par \par The patient is a 80 year-old right handed woman whose neurologic history began on July 19th, 2014, when she had an episode of being unable to speak. She was complaining of numbness and tingling on right face and right fingers just before the episode. The episode lasted about one hour, and the patient states she remembers the entire spells. Daughter called EMS, and she was taken to [facility] in [place] and stroke and TIA ruled out, and MRA normal. She was confused in the hospital but states she remembers the hospital course and was hospitalized for two days. However a 2 cm meningioma was found. She returned to [facility] and was seen by Dr. Gardner on June 23rd and she started on Keppra 500 mg BID.\par \par She was increased on her Keppra to 750mg bid and Vimpat 100mg bid and had meningioma resection in August 2014.\par \par She had slight upper extremity right sided weakness and fine motor deficits in the right hand upon discharge. On August 22nd 2014, she had another discrete event of word finding difficulties and numbness in the right hand.\par \par She has been doing very well on Keppra and Vimpat with no complaints on the medication. She has had no further events and had repeat MRI in February 2016 that was read with no changes.  However, MRI in October 2017 shows subcentimeter enhancing lesion in the left temporal lobe.\par \par PMHx, FHx, SocHx unchanged\par \par Interval History\par Patient reports no further seizures since Sept 2014.  She has denies numbness, tingling, or changes in speech since that time. She is tolerating Keppra 750mg bid and vimpat 100mg bid well.   2017 found to have possible new lesion on brain, followed by Dr. Finch and no longer there (MRI Feb 2018 and Aug 2019).

## 2022-11-30 ENCOUNTER — RX RENEWAL (OUTPATIENT)
Age: 82
End: 2022-11-30

## 2023-06-02 ENCOUNTER — RX RENEWAL (OUTPATIENT)
Age: 83
End: 2023-06-02

## 2023-07-26 ENCOUNTER — APPOINTMENT (OUTPATIENT)
Dept: UROLOGY | Facility: CLINIC | Age: 83
End: 2023-07-26
Payer: MEDICARE

## 2023-07-26 DIAGNOSIS — R35.0 FREQUENCY OF MICTURITION: ICD-10-CM

## 2023-07-26 DIAGNOSIS — D30.00 BENIGN NEOPLASM OF UNSPECIFIED KIDNEY: ICD-10-CM

## 2023-07-26 PROCEDURE — 88112 CYTOPATH CELL ENHANCE TECH: CPT | Mod: 26

## 2023-07-26 PROCEDURE — 99213 OFFICE O/P EST LOW 20 MIN: CPT

## 2023-07-27 LAB
APPEARANCE: CLEAR
BACTERIA: NEGATIVE /HPF
BILIRUBIN URINE: NEGATIVE
BLOOD URINE: NEGATIVE
CAST: 0 /LPF
COLOR: YELLOW
EPITHELIAL CELLS: 1 /HPF
GLUCOSE QUALITATIVE U: NEGATIVE MG/DL
KETONES URINE: NEGATIVE MG/DL
LEUKOCYTE ESTERASE URINE: ABNORMAL
MICROSCOPIC-UA: NORMAL
NITRITE URINE: NEGATIVE
PH URINE: 5.5
PROTEIN URINE: NEGATIVE MG/DL
RED BLOOD CELLS URINE: 0 /HPF
SPECIFIC GRAVITY URINE: 1.01
URINE CYTOLOGY: NORMAL
UROBILINOGEN URINE: 0.2 MG/DL
WHITE BLOOD CELLS URINE: 3 /HPF

## 2023-07-28 LAB — BACTERIA UR CULT: NORMAL

## 2023-08-25 ENCOUNTER — RX RENEWAL (OUTPATIENT)
Age: 83
End: 2023-08-25

## 2023-09-15 ENCOUNTER — RX RENEWAL (OUTPATIENT)
Age: 83
End: 2023-09-15

## 2023-09-26 ENCOUNTER — APPOINTMENT (OUTPATIENT)
Dept: NEUROLOGY | Facility: CLINIC | Age: 83
End: 2023-09-26
Payer: MEDICARE

## 2023-09-26 VITALS
BODY MASS INDEX: 25.16 KG/M2 | SYSTOLIC BLOOD PRESSURE: 157 MMHG | WEIGHT: 142 LBS | HEIGHT: 63 IN | HEART RATE: 73 BPM | DIASTOLIC BLOOD PRESSURE: 85 MMHG

## 2023-09-26 PROCEDURE — 99215 OFFICE O/P EST HI 40 MIN: CPT

## 2024-02-26 ENCOUNTER — RX RENEWAL (OUTPATIENT)
Age: 84
End: 2024-02-26

## 2024-04-26 ENCOUNTER — RX RENEWAL (OUTPATIENT)
Age: 84
End: 2024-04-26

## 2024-09-05 ENCOUNTER — RX RENEWAL (OUTPATIENT)
Age: 84
End: 2024-09-05

## 2024-10-15 ENCOUNTER — NON-APPOINTMENT (OUTPATIENT)
Age: 84
End: 2024-10-15

## 2024-10-15 ENCOUNTER — APPOINTMENT (OUTPATIENT)
Dept: NEUROLOGY | Facility: CLINIC | Age: 84
End: 2024-10-15
Payer: MEDICARE

## 2024-10-15 VITALS
BODY MASS INDEX: 22.15 KG/M2 | HEIGHT: 63 IN | WEIGHT: 125 LBS | DIASTOLIC BLOOD PRESSURE: 84 MMHG | HEART RATE: 74 BPM | SYSTOLIC BLOOD PRESSURE: 136 MMHG

## 2024-10-15 PROCEDURE — 99215 OFFICE O/P EST HI 40 MIN: CPT

## 2024-10-15 PROCEDURE — G2211 COMPLEX E/M VISIT ADD ON: CPT

## 2024-10-23 ENCOUNTER — NON-APPOINTMENT (OUTPATIENT)
Age: 84
End: 2024-10-23

## 2024-10-23 ENCOUNTER — APPOINTMENT (OUTPATIENT)
Dept: UROLOGY | Facility: CLINIC | Age: 84
End: 2024-10-23
Payer: MEDICARE

## 2024-10-23 DIAGNOSIS — D30.00 BENIGN NEOPLASM OF UNSPECIFIED KIDNEY: ICD-10-CM

## 2024-10-23 PROCEDURE — G2211 COMPLEX E/M VISIT ADD ON: CPT

## 2024-10-23 PROCEDURE — 99214 OFFICE O/P EST MOD 30 MIN: CPT

## 2024-10-24 LAB
APPEARANCE: CLEAR
BACTERIA: NEGATIVE /HPF
BILIRUBIN URINE: NEGATIVE
BLOOD URINE: NEGATIVE
CAST: 0 /LPF
COLOR: YELLOW
EPITHELIAL CELLS: 2 /HPF
GLUCOSE QUALITATIVE U: NEGATIVE MG/DL
KETONES URINE: NEGATIVE MG/DL
LEUKOCYTE ESTERASE URINE: ABNORMAL
MICROSCOPIC-UA: NORMAL
NITRITE URINE: NEGATIVE
PH URINE: 6
PROTEIN URINE: 30 MG/DL
RED BLOOD CELLS URINE: 1 /HPF
SPECIFIC GRAVITY URINE: 1.02
UROBILINOGEN URINE: 0.2 MG/DL
WHITE BLOOD CELLS URINE: 19 /HPF

## 2024-10-28 LAB — BACTERIA UR CULT: ABNORMAL

## 2024-11-21 ENCOUNTER — RX RENEWAL (OUTPATIENT)
Age: 84
End: 2024-11-21